# Patient Record
(demographics unavailable — no encounter records)

---

## 2024-11-14 NOTE — HISTORY OF PRESENT ILLNESS
[FreeTextEntry1] : 77 yo quit smoking 12/23 .He presented then change MS.He was hypoxic .He went [facility] one month.Echo 12/23 ef 54% mild ai mild mod ti Now off o2. He denies chest pain. He gets trevizo. No change . Not exercise

## 2024-11-14 NOTE — ASSESSMENT
[FreeTextEntry1] : 79 yo quit smoking 12/23 .He presented then change MS.He was hypoxic .He went [facility] one month.Echo 12/23 ef 54% mild ai mild mod ti Nwo off o2. He denies chest pain. He gets trevizo. Prognosis poor. Echo about 12/24 He had positive  troponin . Neg lexiscan 8/24. He stopped amlodipinem.Echo soon

## 2024-11-14 NOTE — REVIEW OF SYSTEMS
[Dyspnea on exertion] : dyspnea during exertion [Nocturia] : nocturia [Fever] : no fever [Chills] : no chills [Blurry Vision] : no blurred vision [Hearing Loss] : no hearing loss [Wheezing] : no wheezing [Abdominal Pain] : no abdominal pain [Joint Pain] : no joint pain [Rash] : no rash [Dizziness] : no dizziness [Confusion] : no confusion was observed [Easy Bleeding] : no tendency for easy bleeding

## 2024-11-14 NOTE — PHYSICAL EXAM
[Well Developed] : well developed [Well Nourished] : well nourished [No Acute Distress] : no acute distress [Normal Conjunctiva] : normal conjunctiva [Normal Venous Pressure] : normal venous pressure [No Carotid Bruit] : no carotid bruit [Normal S1, S2] : normal S1, S2 [No Murmur] : no murmur [No Rub] : no rub [Clear Lung Fields] : clear lung fields [Good Air Entry] : good air entry [Soft] : abdomen soft [Non Tender] : non-tender [Normal Gait] : normal gait [No Cyanosis] : no cyanosis [No Rash] : no rash [Alert and Oriented] : alert and oriented

## 2025-02-10 NOTE — ASSESSMENT
[FreeTextEntry1] : #COPD #Lung Cancer Screening -> 25 pack year smoker - On Albuterol PRN - CT chest denied per documentation.  - pulmonary follow up - has appointment next week  #HTN #suspected CAD - Echo: 54% mild ai mild mod ti - following Lefkovliz, - Lexiscan negative in 8/24 - cw enalapril - cw regular cardio follow up. May need repeat echo soon   #CKD stage 3 - c/w Enalapril - Nephro referral - PTH, Phosphorus, Vit D, Urine Prot and albumin ordered  #Cachexia -#Malnutrition - Nutrition Referral  #HCM - routine blood work - Refused colonoscopy.  - Lung CA screening: low dose CT reorder - AAA screeing: abdominal US - prevanar-20 vax given in April - 3 month follow up.

## 2025-02-10 NOTE — HISTORY OF PRESENT ILLNESS
[FreeTextEntry1] : FU  [de-identified] : 78 year old male PMH COPD, HTN, new CKD presents for follow up, patient is here for FU.  Never had colonoscopy Significant smoking history more than 25 pack year. Never had LC screening or AAA screening. No complaints at this time.

## 2025-02-10 NOTE — REVIEW OF SYSTEMS
[Negative] : ENT [Fever] : no fever [Chills] : no chills [Night Sweats] : no night sweats [Recent Change In Weight] : ~T no recent weight change [Discharge] : no discharge [Pain] : no pain [Redness] : no redness [Vision Problems] : no vision problems [Itching] : no itching [Chest Pain] : no chest pain [Palpitations] : no palpitations [Claudication] : no  leg claudication [Orthopena] : no orthopnea [Paroxysmal Nocturnal Dyspnea] : no paroxysmal nocturnal dyspnea [Shortness Of Breath] : no shortness of breath [Wheezing] : no wheezing [Cough] : no cough [Abdominal Pain] : no abdominal pain [Nausea] : no nausea [Constipation] : no constipation [Vomiting] : no vomiting [Heartburn] : no heartburn [Melena] : no melena [Dysuria] : no dysuria [Incontinence] : no incontinence [Hematuria] : no hematuria [Frequency] : no frequency [Joint Pain] : no joint pain [Joint Stiffness] : no joint stiffness [Back Pain] : no back pain [Joint Swelling] : no joint swelling [Mole Changes] : no mole changes [Nail Changes] : no nail changes [Skin Rash] : no skin rash [Headache] : no headache

## 2025-02-10 NOTE — PHYSICAL EXAM
[No Acute Distress] : no acute distress [Well Nourished] : well nourished [Well Developed] : well developed [Normal Sclera/Conjunctiva] : normal sclera/conjunctiva [Normal Outer Ear/Nose] : the outer ears and nose were normal in appearance [Normal Oropharynx] : the oropharynx was normal [No JVD] : no jugular venous distention [No Lymphadenopathy] : no lymphadenopathy [Supple] : supple [Thyroid Normal, No Nodules] : the thyroid was normal and there were no nodules present [No Respiratory Distress] : no respiratory distress  [No Accessory Muscle Use] : no accessory muscle use [Clear to Auscultation] : lungs were clear to auscultation bilaterally [Normal Rate] : normal rate  [Regular Rhythm] : with a regular rhythm [Normal S1, S2] : normal S1 and S2 [No Murmur] : no murmur heard [No Carotid Bruits] : no carotid bruits [No Abdominal Bruit] : a ~M bruit was not heard ~T in the abdomen [No Edema] : there was no peripheral edema [No Palpable Aorta] : no palpable aorta [Soft] : abdomen soft [Non Tender] : non-tender [No HSM] : no HSM [Normal Bowel Sounds] : normal bowel sounds [Normal Anterior Cervical Nodes] : no anterior cervical lymphadenopathy [No CVA Tenderness] : no CVA  tenderness [No Joint Swelling] : no joint swelling [No Rash] : no rash

## 2025-02-10 NOTE — END OF VISIT
[] : Resident [FreeTextEntry3] : Pt. did not complete blood work, and refused to see GI for colorectal cancer screening, and refused to see nephrology for CKD despite risk/benefits/alternative and potential consequences fully discussed with pt.  Pt. also refused chest CT for lung cancer screening since he cannot afford to pay the CT (copay).  He agrees to do blood work, and refused other HCM.

## 2025-03-07 NOTE — PHYSICAL EXAM
[Normal Oropharynx] : normal oropharynx [Normal Appearance] : normal appearance [No Neck Mass] : no neck mass [Normal Rate/Rhythm] : normal rate/rhythm [Normal S1, S2] : normal s1, s2 [No Murmurs] : no murmurs [No Abnormalities] : no abnormalities [Benign] : benign [Normal Gait] : normal gait [No Clubbing] : no clubbing [No Cyanosis] : no cyanosis [No Edema] : no edema [FROM] : FROM [Normal Color/ Pigmentation] : normal color/ pigmentation [No Focal Deficits] : no focal deficits [Oriented x3] : oriented x3 [Normal Affect] : normal affect [TextBox_2] : CACHECTIC [TextBox_68] : DEC BS BOTH BASES

## 2025-03-07 NOTE — HISTORY OF PRESENT ILLNESS
[TextBox_4] : SP PFT MODERATE COPD DO NOT WANT TO HAVE CT SOB ON EXERTION AS NEEDED INHALERS STOPPED SMOKING DENNY BURNETT

## 2025-03-07 NOTE — DISCUSSION/SUMMARY
[FreeTextEntry1] : COPD Moderate To start maintenance patient declined very expensive As needed albuterol Chest CT screening declined cousin present

## 2025-04-10 NOTE — ASSESSMENT
[FreeTextEntry1] : 77 yo quit smoking 12/23 .He presented then change MS.He was hypoxic .He went [facility] one month.Echo 12/23 ef 54% mild ai mild mod ti Nwo off o2.  He denies chest pain. He gets trevizo. Prognosis poor. Echo about 12/24 He had positive  troponin . Neg lexiscan 8/24. He stopped amlodipine. EKG reviewed. Blood reviewed Echo soon

## 2025-07-14 NOTE — HISTORY OF PRESENT ILLNESS
[FreeTextEntry1] : FU  [de-identified] : 79 year old ex smoker male with a PMH of COPD, HTN, CKD presents for follow up  He has no complaints at this time Significant smoking history more than 25 pack year. Never had LC screening or AAA screening. Patient sees Dr. Merlin Morales for Pulm

## 2025-07-14 NOTE — HISTORY OF PRESENT ILLNESS
[FreeTextEntry1] : FU  [de-identified] : 79 year old ex smoker male with a PMH of COPD, HTN, CKD presents for follow up  He has no complaints at this time Significant smoking history more than 25 pack year. Never had LC screening or AAA screening. Patient sees Dr. Merlin Morales for Pulm

## 2025-07-14 NOTE — ASSESSMENT
[FreeTextEntry1] : #COPD Moderate #Lung Cancer Screening -> 25 pack year smoker - On Albuterol PRN- states he barely uses it - CT chest denied per documentation and today he agrees to have it ordered - pulmonary is on board - Does not use other inhalers 2/2 cost   #HTN #suspected CAD - Echo: 54% mild ai mild mod ti - following Lefkovic, - Lexiscan negative in 8/24 - cw enalapril - cw regular cardio follow up. May need repeat echo soon   #CKD stage 3a - c/w Enalapril 10 - Nephro referral - FU urine/protein  # Low Vit D - On Vit D once a week  #Cachexia -#Malnutrition - Nutrition Referral denied  #HCM - routine blood work - Refused colonoscopy. Patient was explained the risks of cancer screening but is adamant about not getting routine cancer screening - Lung CA screening: low dose CT reorder - prevanar-20 vax given in April - 6 month follow up.

## 2025-07-14 NOTE — REVIEW OF SYSTEMS
[Fever] : no fever [Chills] : no chills [Night Sweats] : no night sweats [Recent Change In Weight] : ~T no recent weight change [Discharge] : no discharge [Pain] : no pain [Redness] : no redness [Vision Problems] : no vision problems [Itching] : no itching [Chest Pain] : no chest pain [Palpitations] : no palpitations [Claudication] : no  leg claudication [Orthopena] : no orthopnea [Paroxysmal Nocturnal Dyspnea] : no paroxysmal nocturnal dyspnea [Shortness Of Breath] : no shortness of breath [Wheezing] : no wheezing [Cough] : no cough [Abdominal Pain] : no abdominal pain [Nausea] : no nausea [Constipation] : no constipation [Vomiting] : no vomiting [Heartburn] : no heartburn [Melena] : no melena [Dysuria] : no dysuria [Incontinence] : no incontinence [Hematuria] : no hematuria [Frequency] : no frequency [Joint Pain] : no joint pain [Joint Stiffness] : no joint stiffness [Back Pain] : no back pain [Joint Swelling] : no joint swelling [Itching] : no itching [Mole Changes] : no mole changes [Nail Changes] : no nail changes [Skin Rash] : no skin rash [Headache] : no headache